# Patient Record
Sex: FEMALE | URBAN - METROPOLITAN AREA
[De-identification: names, ages, dates, MRNs, and addresses within clinical notes are randomized per-mention and may not be internally consistent; named-entity substitution may affect disease eponyms.]

---

## 2019-04-23 ENCOUNTER — OFFICE VISIT (OUTPATIENT)
Dept: URBAN - METROPOLITAN AREA CLINIC 58 | Facility: CLINIC | Age: 64
End: 2019-04-23
Payer: COMMERCIAL

## 2019-04-23 DIAGNOSIS — H25.13 AGE-RELATED NUCLEAR CATARACT, BILATERAL: ICD-10-CM

## 2019-04-23 DIAGNOSIS — H52.4 PRESBYOPIA: Primary | ICD-10-CM

## 2019-04-23 DIAGNOSIS — H40.033 ANATOMICAL NARROW ANGLE, BILATERAL: ICD-10-CM

## 2019-04-23 PROCEDURE — 92002 INTRM OPH EXAM NEW PATIENT: CPT | Performed by: OPTOMETRIST

## 2019-04-23 PROCEDURE — 92015 DETERMINE REFRACTIVE STATE: CPT | Performed by: OPTOMETRIST

## 2019-04-23 RX ORDER — LOTEPREDNOL ETABONATE 5 MG/G
0.5 % GEL OPHTHALMIC
Qty: 1 | Refills: 1 | Status: ACTIVE
Start: 2019-04-23

## 2019-04-23 ASSESSMENT — KERATOMETRY
OD: 43.88
OS: 44.13

## 2019-04-23 ASSESSMENT — VISUAL ACUITY
OD: 20/20
OS: 20/20

## 2019-04-23 ASSESSMENT — INTRAOCULAR PRESSURE
OS: 18
OD: 18

## 2019-04-23 NOTE — IMPRESSION/PLAN
Impression: Age-related nuclear cataract, bilateral: H25.13. Plan: Discussed diagnosis in detail with patient. No treatment is required at this time. Will continue to observe condition and or symptoms. Call if [address] worsens.

## 2019-04-23 NOTE — IMPRESSION/PLAN
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 Plan: Discussed diagnosis in detail with patient. Advised patient of condition. Start Lotemax gel 1gtt TID OS for 3 weeks. Call if worse.

## 2019-04-23 NOTE — IMPRESSION/PLAN
Impression: Anatomical narrow angle, bilateral: H40.033. Plan: Discussed diagnosis with PT in detail. Risk of glaucoma explained.   Recommend LPI eval.

## 2019-05-16 ENCOUNTER — OFFICE VISIT (OUTPATIENT)
Dept: URBAN - METROPOLITAN AREA CLINIC 58 | Facility: CLINIC | Age: 64
End: 2019-05-16
Payer: COMMERCIAL

## 2019-05-16 PROCEDURE — 99213 OFFICE O/P EST LOW 20 MIN: CPT | Performed by: OPTOMETRIST

## 2019-05-16 ASSESSMENT — INTRAOCULAR PRESSURE: OS: 15

## 2019-05-16 NOTE — IMPRESSION/PLAN
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 Plan: Discussed diagnosis in detail with patient. Advised patient of condition. Switch to lotemax shante tid OS. Call if worse.

## 2019-06-06 ENCOUNTER — OFFICE VISIT (OUTPATIENT)
Dept: URBAN - METROPOLITAN AREA CLINIC 58 | Facility: CLINIC | Age: 64
End: 2019-06-06
Payer: COMMERCIAL

## 2019-06-06 DIAGNOSIS — H04.139: Primary | ICD-10-CM

## 2019-06-06 PROCEDURE — 99213 OFFICE O/P EST LOW 20 MIN: CPT | Performed by: OPTOMETRIST

## 2019-06-06 NOTE — IMPRESSION/PLAN
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 Plan: Discussed diagnosis in detail with patient. Advised patient of condition. Continue Lotemax shante morning and night OS. Pt states she will call if she needs to be seen.